# Patient Record
Sex: MALE | Race: WHITE | NOT HISPANIC OR LATINO | Employment: UNEMPLOYED | URBAN - METROPOLITAN AREA
[De-identification: names, ages, dates, MRNs, and addresses within clinical notes are randomized per-mention and may not be internally consistent; named-entity substitution may affect disease eponyms.]

---

## 2017-05-16 ENCOUNTER — TELEPHONE (OUTPATIENT)
Dept: FAMILY MEDICINE | Facility: CLINIC | Age: 57
End: 2017-05-16

## 2017-05-16 NOTE — TELEPHONE ENCOUNTER
Panel Management Review          Composite cancer screening  Chart review shows that this patient is due/due soon for the following Colonoscopy  Summary:    Patient is due/failing the following:   COLONOSCOPY    Action needed:   Patient needs referral/order: colonoscopy    Type of outreach:    Sent letter.    Questions for provider review:    None                                                                                                                                    Heavenly Arora CMA

## 2017-05-16 NOTE — LETTER
[facility]  [address] [address]  Phone: [phone]    May 16, 2017    Guillaume Borrerostrom  [address] [place] [address]              Dear Mr. Garcia,    At this time you are due for a Colon Cancer Screening. Here is some information regarding this testing.     Recommended every 5-10 years, depending on your history, in order to prevent and detect colon cancer at its earliest stages.  Colon cancer is now the second leading cause of death in the [place] for both men and women and there are over 130,000 new cases and 50,000 deaths per year from colon cancer.  Colonoscopies can prevent 90-95% of these deaths.  Problem lesions can be removed before they ever become cancer. This test is not only looking for cancer, but also getting rid of precancerious lesions. You are usually given some sedation which makes the test very comfortable for most people.      If you do not wish to do a colonoscopy or cannot afford to do one, at this time, there is another option. It is called a FIT test or Fecal Immunochemical Occult Blood Test (take home stool sample kit).  It does not replace the colonoscopy for colorectal cancer screening, but it can detect hidden bleeding in the lower colon.  It does need to be repeated every year and if a positive result is obtained, you would be referred for a colonoscopy. The FIT test is really easy to do and does not require any  diet or medication restrictions and involves only one collection sample.      If you have completed either one of these tests or had a flexible sigmoidoscopy in the past five years at another facility, please have the records sent to our clinic so that we can best coordinate your care and update your chart.  Please call us if you have questions or would like arrange either to do a colonoscopy or obtain the necessary test kit for the Fecal Occult Test.    Sincerely,      Bobby Hanks MD / dipti

## 2018-08-15 ENCOUNTER — NURSE TRIAGE (OUTPATIENT)
Dept: NURSING | Facility: CLINIC | Age: 58
End: 2018-08-15

## 2018-08-15 NOTE — TELEPHONE ENCOUNTER
Connected to schedulers.  Mckenzie Wray RN  [facility]      Reason for Disposition    Shoulder pain is a chronic symptom (recurrent or ongoing AND present > 4 weeks)    Additional Information    Negative: Difficulty breathing or unusual sweating (e.g., sweating without exertion)    Negative: [1] Pain lasting > 5 minutes AND [2] pain also present in chest (Exception: pain is clearly made worse by movement)    Negative: [1] Age > 40 AND [2] no obvious cause AND [3] pain even when not moving the arm    (Exception: pain is clearly made worse by moving arm or bending neck)    Negative: [1] SEVERE pain AND [2] not improved 2 hours after pain medicine    Negative: [1] Red area or streak AND [2] fever    Negative: [1] Swollen joint AND [2] fever    Negative: Patient sounds very sick or weak to the triager    Protocols used: SHOULDER PAIN-ADULT-

## 2019-03-06 ENCOUNTER — ANCILLARY PROCEDURE (OUTPATIENT)
Dept: GENERAL RADIOLOGY | Facility: CLINIC | Age: 59
End: 2019-03-06
Attending: PEDIATRICS
Payer: MEDICARE

## 2019-03-06 ENCOUNTER — OFFICE VISIT (OUTPATIENT)
Dept: ORTHOPEDICS | Facility: CLINIC | Age: 59
End: 2019-03-06
Payer: MEDICARE

## 2019-03-06 VITALS
BODY MASS INDEX: 22.68 KG/M2 | WEIGHT: 162 LBS | HEIGHT: 71 IN | DIASTOLIC BLOOD PRESSURE: 72 MMHG | SYSTOLIC BLOOD PRESSURE: 114 MMHG

## 2019-03-06 DIAGNOSIS — M19.011 ARTHRITIS OF RIGHT SHOULDER REGION: ICD-10-CM

## 2019-03-06 DIAGNOSIS — M19.012 ARTHRITIS OF BOTH ACROMIOCLAVICULAR JOINTS: ICD-10-CM

## 2019-03-06 DIAGNOSIS — M19.011 ARTHRITIS OF BOTH ACROMIOCLAVICULAR JOINTS: ICD-10-CM

## 2019-03-06 DIAGNOSIS — M25.511 RIGHT SHOULDER PAIN: ICD-10-CM

## 2019-03-06 DIAGNOSIS — M25.511 CHRONIC RIGHT SHOULDER PAIN: Primary | ICD-10-CM

## 2019-03-06 DIAGNOSIS — G89.29 CHRONIC RIGHT SHOULDER PAIN: Primary | ICD-10-CM

## 2019-03-06 DIAGNOSIS — S43.102S SEPARATION OF LEFT ACROMIOCLAVICULAR JOINT, SEQUELA: ICD-10-CM

## 2019-03-06 PROCEDURE — 99204 OFFICE O/P NEW MOD 45 MIN: CPT | Performed by: PEDIATRICS

## 2019-03-06 PROCEDURE — 73030 X-RAY EXAM OF SHOULDER: CPT | Mod: RT

## 2019-03-06 ASSESSMENT — MIFFLIN-ST. JEOR: SCORE: 1576.96

## 2019-03-06 NOTE — LETTER
3/6/2019         RE: Guillaume Garcia  [address] [place] [id number]        Dear Colleague,    Thank you for referring your patient, Guillaume Garcia, to the [facility]. Please see a copy of my visit note below.    Sports Medicine Clinic Visit    PCP: No Ref-Primary, Physician    Guillaume Garcia is a 58 year old male who is seen  as a self referral presenting with bilateral shoulder pain, would like to focus on right shoulder.    Injury: He reports 2 years of chronic shoulder pain. He reports he slipped on ice landing on right shoulder 2 years ago and again 3 weeks ago. He reports pain with shoulder flexion and abduction. He has popping and clicking in in his shoulder. He has difficulty with overhead motions. He is right hand dominate.  - Left shoulder injury years ago, possible dislocation a she describes someone trying to relocate.    Location of Pain: right posterior shoulder  Duration of Pain: 2 year(s)  Rating of Pain at worst: 8/10  Rating of Pain Currently: 7/10  Symptoms are better with: rest  Symptoms are worse with: flexion and overhead motions  Additional Features:   Positive: popping, grinding, instability and weakness   Negative: swelling, bruising, catching, locking, paresthesias and numbness  Other evaluation and/or treatments so far consists of: Nothing  Prior History of related problems: fell on ice twice over the last 2 years    Social History: Disability    Review of Systems  Skin: no bruising, no swelling  Musculoskeletal: as above  Neurologic: no numbness, paresthesias  Remainder of review of systems is negative including constitutional, CV, pulmonary, GI, except as noted in HPI or medical history.    Patient's current problem list, past medical and surgical history, and family history were reviewed.    There is no problem list on file for this patient.    No past medical history on file.  No past surgical history on file.  Family History   Problem  "Relation Age of Onset     Unknown/Adopted Mother      Unknown/Adopted Father      Unknown/Adopted Brother      Unknown/Adopted Sister      Unknown/Adopted Daughter      Unknown/Adopted Brother          Objective  /72 (BP Location: Left arm, Patient Position: Chair, Cuff Size: Adult Regular)   Ht 1.803 m (5' 11\")   Wt 73.5 kg (162 lb)   BMI 22.59 kg/m       GENERAL APPEARANCE: healthy, alert and no distress   GAIT: NORMAL  SKIN: no suspicious lesions or rashes  HEENT: Sclera clear, anicteric  CV: good peripheral pulses  RESP: Breathing not labored  NEURO: Normal strength and tone, mentation intact and speech normal  PSYCH:  mentation appears normal and affect normal/bright    Bilateral Shoulder exam  Inspection and Posture:       Bilateral AC joint deformities, right > left    Skin:        no visible deformities    Tender:        acromioclavicular joint bilateral (mild)       subacromial space bilateral (mild)    Non Tender:       remainder of shoulder bilateral    ROM:        forward flexion 140  bilateral       abduction 140 bilateral       internal rotation lumbar region bilateral       external rotation 45 bilateral    Painful motions:       end range flexion and elevation bilateral    Strength:        abduction 4/5 bilateral       flexion 4/5 bilateral       internal rotation 5/5 bilateral       external rotation 5/5 bilateral    Impingement testing:       positive (+) Neer bilateral       positive (+) Saunders bilateral    Sensation:        normal sensation over shoulder and upper extremity     Radiology  I ordered, visualized and reviewed these images with the patient  3 XR views of right shoulder reviewed: glenohumeral and ac joint arthritis with high riding humeral head  - will follow official read    I visualized and reviewed these images with the patient  LEFT SHOULDER TWO VIEWS  12/6/2016 10:50 AM  HISTORY: Acquired deformity.  COMPARISON: None.                                                      "      IMPRESSION: No fracture or osseous lesion is seen. The glenohumeral  joint is well preserved. There is widening of the acromioclavicular  joint measuring up to 1.2 cm. There is also calcification or  ossification adjacent to the joint space. This may be due to an old  injury or degenerative change. No other abnormality is seen. No  definite acute pathology is noted.      Assessment:  1. Chronic right shoulder pain    2. Arthritis of right shoulder region    3. Arthritis of both acromioclavicular joints    4. Separation of left acromioclavicular joint, sequela      Chronic right shoulder pain with likely chronic rotator cuff tear given arthritis.  We discussed the following treatment options: symptom treatment, activity modification/rest, imaging, rehab and referral. Following discussion, plan:will obtain MRI to evaluate rotator cuff.  - Left shoulder likely AC arthritis with prior AC sprain.  Discussed options for treatment including PT and , patient would like to focus on right shoulder first.    Plan:  - Today's Plan of Care:  MRI of the right shoulder. Call [phone] to schedule MRI    -We also discussed other future treatment options:  Referral to orthopedic surgeon, Rehab: Physical Therapy or Steroid injection of shoulder    Follow Up: In clinic with Dr. Blanco after MRI (wait at least 1-2 days)    Concerning signs and symptoms were reviewed.  The patient expressed understanding of this management plan and all questions were answered at this time.    Yennifer Blanco MD CAQ  Primary Care Sports Medicine  [facility]    Again, thank you for allowing me to participate in the care of your patient.        Sincerely,        Yennifer Blanco MD

## 2019-03-06 NOTE — PROGRESS NOTES
Sports Medicine Clinic Visit    PCP: No Ref-Primary, Physician    Guillaume Garcia is a 58 year old male who is seen  as a self referral presenting with bilateral shoulder pain, would like to focus on right shoulder.    Injury: He reports 2 years of chronic shoulder pain. He reports he slipped on ice landing on right shoulder 2 years ago and again 3 weeks ago. He reports pain with shoulder flexion and abduction. He has popping and clicking in in his shoulder. He has difficulty with overhead motions. He is right hand dominate.  - Left shoulder injury years ago, possible dislocation a she describes someone trying to relocate.    Location of Pain: right posterior shoulder  Duration of Pain: 2 year(s)  Rating of Pain at worst: 8/10  Rating of Pain Currently: 7/10  Symptoms are better with: rest  Symptoms are worse with: flexion and overhead motions  Additional Features:   Positive: popping, grinding, instability and weakness   Negative: swelling, bruising, catching, locking, paresthesias and numbness  Other evaluation and/or treatments so far consists of: Nothing  Prior History of related problems: fell on ice twice over the last 2 years    Social History: Disability    Review of Systems  Skin: no bruising, no swelling  Musculoskeletal: as above  Neurologic: no numbness, paresthesias  Remainder of review of systems is negative including constitutional, CV, pulmonary, GI, except as noted in HPI or medical history.    Patient's current problem list, past medical and surgical history, and family history were reviewed.    There is no problem list on file for this patient.    No past medical history on file.  No past surgical history on file.  Family History   Problem Relation Age of Onset     Unknown/Adopted Mother      Unknown/Adopted Father      Unknown/Adopted Brother      Unknown/Adopted Sister      Unknown/Adopted Daughter      Unknown/Adopted Brother          Objective  /72 (BP Location: Left arm, Patient  "Position: Chair, Cuff Size: Adult Regular)   Ht 1.803 m (5' 11\")   Wt 73.5 kg (162 lb)   BMI 22.59 kg/m      GENERAL APPEARANCE: healthy, alert and no distress   GAIT: NORMAL  SKIN: no suspicious lesions or rashes  HEENT: Sclera clear, anicteric  CV: good peripheral pulses  RESP: Breathing not labored  NEURO: Normal strength and tone, mentation intact and speech normal  PSYCH:  mentation appears normal and affect normal/bright    Bilateral Shoulder exam  Inspection and Posture:       Bilateral AC joint deformities, right > left    Skin:        no visible deformities    Tender:        acromioclavicular joint bilateral (mild)       subacromial space bilateral (mild)    Non Tender:       remainder of shoulder bilateral    ROM:        forward flexion 140  bilateral       abduction 140 bilateral       internal rotation lumbar region bilateral       external rotation 45 bilateral    Painful motions:       end range flexion and elevation bilateral    Strength:        abduction 4/5 bilateral       flexion 4/5 bilateral       internal rotation 5/5 bilateral       external rotation 5/5 bilateral    Impingement testing:       positive (+) Neer bilateral       positive (+) Saunders bilateral    Sensation:        normal sensation over shoulder and upper extremity     Radiology  I ordered, visualized and reviewed these images with the patient  3 XR views of right shoulder reviewed: glenohumeral and ac joint arthritis with high riding humeral head  - will follow official read    I visualized and reviewed these images with the patient  LEFT SHOULDER TWO VIEWS  12/6/2016 10:50 AM  HISTORY: Acquired deformity.  COMPARISON: None.                                                           IMPRESSION: No fracture or osseous lesion is seen. The glenohumeral  joint is well preserved. There is widening of the acromioclavicular  joint measuring up to 1.2 cm. There is also calcification or  ossification adjacent to the joint space. This may " be due to an old  injury or degenerative change. No other abnormality is seen. No  definite acute pathology is noted.      Assessment:  1. Chronic right shoulder pain    2. Arthritis of right shoulder region    3. Arthritis of both acromioclavicular joints    4. Separation of left acromioclavicular joint, sequela      Chronic right shoulder pain with likely chronic rotator cuff tear given arthritis.  We discussed the following treatment options: symptom treatment, activity modification/rest, imaging, rehab and referral. Following discussion, plan:will obtain MRI to evaluate rotator cuff.  - Left shoulder likely AC arthritis with prior AC sprain.  Discussed options for treatment including PT and , patient would like to focus on right shoulder first.    Plan:  - Today's Plan of Care:  MRI of the right shoulder. Call [phone] to schedule MRI    -We also discussed other future treatment options:  Referral to orthopedic surgeon, Rehab: Physical Therapy or Steroid injection of shoulder    Follow Up: In clinic with Dr. Blanco after MRI (wait at least 1-2 days)    Concerning signs and symptoms were reviewed.  The patient expressed understanding of this management plan and all questions were answered at this time.    Yennifer Blanco MD CA  Primary Care Sports Medicine  [facility]

## 2019-03-06 NOTE — PATIENT INSTRUCTIONS
Plan:  - Today's Plan of Care:  MRI of the right shoulder. Call [phone] to schedule MRI    -We also discussed other future treatment options:  Referral to orthopedic surgeon, Rehab: Physical Therapy or Steroid injection of shoulder    Follow Up: In clinic with Dr. Blanco after MRI (wait at least 1-2 days)    If you have any further questions for your physician or physician s care team you can call [phone] and use option 3 to leave a voice message. Calls received during business hours will be returned same day.

## 2019-03-13 ENCOUNTER — HOSPITAL ENCOUNTER (OUTPATIENT)
Dept: MRI IMAGING | Facility: CLINIC | Age: 59
Discharge: HOME | End: 2019-03-13
Attending: PEDIATRICS | Admitting: PEDIATRICS
Payer: MEDICARE

## 2019-03-13 DIAGNOSIS — M25.511 CHRONIC RIGHT SHOULDER PAIN: ICD-10-CM

## 2019-03-13 DIAGNOSIS — M19.011 ARTHRITIS OF BOTH ACROMIOCLAVICULAR JOINTS: ICD-10-CM

## 2019-03-13 DIAGNOSIS — M19.011 ARTHRITIS OF RIGHT SHOULDER REGION: ICD-10-CM

## 2019-03-13 DIAGNOSIS — G89.29 CHRONIC RIGHT SHOULDER PAIN: ICD-10-CM

## 2019-03-13 DIAGNOSIS — M19.012 ARTHRITIS OF BOTH ACROMIOCLAVICULAR JOINTS: ICD-10-CM

## 2019-03-13 PROCEDURE — 73221 MRI JOINT UPR EXTREM W/O DYE: CPT | Mod: RT

## 2019-03-27 ENCOUNTER — TELEPHONE (OUTPATIENT)
Dept: ORTHOPEDICS | Facility: CLINIC | Age: 59
End: 2019-03-27

## 2019-03-27 ENCOUNTER — OFFICE VISIT (OUTPATIENT)
Dept: ORTHOPEDICS | Facility: CLINIC | Age: 59
End: 2019-03-27
Payer: MEDICARE

## 2019-03-27 VITALS
SYSTOLIC BLOOD PRESSURE: 118 MMHG | BODY MASS INDEX: 22.82 KG/M2 | HEIGHT: 71 IN | WEIGHT: 163 LBS | DIASTOLIC BLOOD PRESSURE: 62 MMHG

## 2019-03-27 DIAGNOSIS — M19.011 ARTHRITIS OF BOTH ACROMIOCLAVICULAR JOINTS: ICD-10-CM

## 2019-03-27 DIAGNOSIS — M25.511 CHRONIC RIGHT SHOULDER PAIN: Primary | ICD-10-CM

## 2019-03-27 DIAGNOSIS — G89.29 CHRONIC RIGHT SHOULDER PAIN: Primary | ICD-10-CM

## 2019-03-27 DIAGNOSIS — M12.811 ROTATOR CUFF ARTHROPATHY OF RIGHT SHOULDER: ICD-10-CM

## 2019-03-27 DIAGNOSIS — S43.102S SEPARATION OF LEFT ACROMIOCLAVICULAR JOINT, SEQUELA: ICD-10-CM

## 2019-03-27 DIAGNOSIS — M19.012 ARTHRITIS OF BOTH ACROMIOCLAVICULAR JOINTS: ICD-10-CM

## 2019-03-27 DIAGNOSIS — M19.011 ARTHRITIS OF RIGHT SHOULDER REGION: ICD-10-CM

## 2019-03-27 PROCEDURE — 99213 OFFICE O/P EST LOW 20 MIN: CPT | Performed by: PEDIATRICS

## 2019-03-27 ASSESSMENT — MIFFLIN-ST. JEOR: SCORE: 1581.49

## 2019-03-27 NOTE — PROGRESS NOTES
Sports Medicine Clinic Visit - Interim History March 27, 2019    PCP: No Ref-Primary, Physician    Guillaume Garcia is a 58 year old male who is seen in f/u up for    Chronic right shoulder pain  Arthritis of right shoulder region  Arthritis of both acromioclavicular joints  Separation of left acromioclavicular joint, sequela  Rotator cuff arthropathy of right shoulder.  Since last visit on 3/6/19 patient has completed an MRI of his right shoulder. He reports an improvement in his ROM especially with over head directions. He reports the pain has not improved. He still reports popping and instability in his shoulder.    Initial Injury History 3/6/2019: He reports 2 years of chronic shoulder pain. He reports he slipped on ice landing on right shoulder 2 years ago and again 3 weeks ago. He reports pain with shoulder flexion and abduction. He has popping and clicking in in his shoulder. He has difficulty with overhead motions. He is right hand dominate.  - Left shoulder injury years ago, possible dislocation a she describes someone trying to relocate.    Symptoms are better with: Rest  Symptoms are worse with: flexion and overhead motions  Additional Features:   Positive: popping, grinding, instability and weakness   Negative: swelling, bruising, catching, locking, paresthesias and numbness    Social History: Disability    Review of Systems  Skin: no bruising, no swelling  Musculoskeletal: as above  Neurologic: no numbness, paresthesias  Remainder of review of systems is negative including constitutional, CV, pulmonary, GI, except as noted in HPI or medical history.    Patient's current problem list, past medical and surgical history, and family history were reviewed.    There is no problem list on file for this patient.    No past medical history on file.  No past surgical history on file.  Family History   Problem Relation Age of Onset     Unknown/Adopted Mother      Unknown/Adopted Father      Unknown/Adopted Brother  "     Unknown/Adopted Sister      Unknown/Adopted Daughter      Unknown/Adopted Brother        Objective  /62 (BP Location: Left arm, Patient Position: Chair, Cuff Size: Adult Regular)   Ht 1.803 m (5' 11\")   Wt 73.9 kg (163 lb)   BMI 22.73 kg/m      GENERAL APPEARANCE: healthy, alert and no distress   GAIT: NORMAL  SKIN: no suspicious lesions or rashes  HEENT: Sclera clear, anicteric  CV: good peripheral pulses  RESP: Breathing not labored  NEURO: Normal strength and tone, mentation intact and speech normal  PSYCH:  mentation appears normal and affect normal/bright     Bilateral Shoulder exam  Inspection and Posture:       Bilateral AC joint deformities, right > left     Skin:        no visible deformities     Tender:        acromioclavicular joint bilateral (mild)       subacromial space bilateral (mild)     Non Tender:       remainder of shoulder bilateral     ROM:        forward flexion 140  bilateral       abduction 140 bilateral       internal rotation lumbar region bilateral       external rotation 45 bilateral     Painful motions:       end range flexion and elevation bilateral     Strength:        abduction 4/5 bilateral       flexion 4/5 bilateral       internal rotation 5/5 bilateral       external rotation 5/5 bilateral     Impingement testing:       positive (+) Neer bilateral       positive (+) Saunders bilateral     Sensation:        normal sensation over shoulder and upper extremity     Radiology  I ordered, visualized and reviewed these images with the patient  MR SHOULDER RIGHT WITHOUT CONTRAST March 13, 2019 9:45 AM     HISTORY: Chronic right shoulder pain and acromioclavicular joint  arthritis. History of injury. Evaluate for rotator cuff tear.     TECHNIQUE: Oblique coronal T1, T2 and T2 fat suppressed images,  oblique sagittal T2 and axial proton density and T2 weighted images  were obtained.      COMPARISON: None.     FINDINGS:  Osseous acromial outlet: Marked hypertrophic degenerative " change at  the acromioclavicular joint. Distal acromial morphology is type II  with neutral slope on oblique sagittal images and mild lateral  downsloping on oblique coronal images. There is a small amount of  abnormal fluid in the subacromial/subdeltoid bursa.     Rotator cuff: There is a full-thickness tear of the supraspinatus  tendon measuring up to 4.2 cm mediolateral dimension and 3.9 cm  anteroposterior dimension. The torn tendon margin is retracted  medially to the level of the bony glenoid margin (image 10 of series  7). Early fatty atrophy of the supraspinatus muscle. The infraspinatus  tendon shows some mild thickening and signal heterogeneity consistent  with degenerative tendinopathy but there is no evidence for  infraspinatus tear. The infraspinatus and teres minor muscles are  normal. The subscapularis tendon is markedly thinned consistent with  advanced degenerative tendinopathy and/or partial thickness tearing  (image 15 of series 5). Moderate to marked fatty atrophy involving the  superior aspect of the subscapularis muscle.     Labrum: The superior glenoid labrum is small and shows ill-defined  linear intrasubstance increased signal which may be degeneration alone  but a SLAP tear is not excluded and could be better evaluated with MR  arthrogram of clinically indicated. The anterior and posterior labrum  are not present consistent with diffuse chronic degeneration and  maceration.     Biceps tendon: There is mild enlargement and intrasubstance increased  signal of the intra-articular biceps tendon consistent with mucoid  degeneration. The tendon appears normal more distally in the bicipital  groove. There is a small biceps tendon sheath effusion.       Osseous structures and cartilaginous surfaces: No acute appearing bony  abnormality. Diffuse grade 2 chondromalacia throughout the  glenohumeral joint. Mild marginal bony spurring of the humeral head.     Additional findings: Small joint space  effusion with evidence for  synovitis.                                                                      IMPRESSION:   1. Large full-thickness tear of the supraspinatus tendon with very  early fatty atrophy of the supraspinatus muscle.  2. Marked degenerative thinning and/or partial thickness tearing of  the subscapularis tendon with fatty atrophy of the superior aspect of  the subscapularis muscle.  3. Diffuse degeneration/maceration of the anterior and posterior  glenoid labrum with probable degeneration of the superior glenoid  labrum. A superior labral tear is not excluded.  4. Mucoid degeneration proximal biceps tendon.  5. Early osteoarthritis glenohumeral joint and marked hypertrophic  degenerative change at the acromioclavicular joint.  6. Small joint space effusion with evidence for synovitis.       I visualized and reviewed these images with the patient  SHOULDER RIGHT THREE OR MORE VIEWS   3/6/2019 10:38 AM   HISTORY: Right shoulder pain.  COMPARISON: None.                                                       IMPRESSION: Moderate to advanced acromioclavicular degenerative  changes. Mild to moderate glenohumeral degenerative changes. No acute  fracture or subluxation.    Assessment:  1. Chronic right shoulder pain    2. Arthritis of right shoulder region    3. Arthritis of both acromioclavicular joints    4. Separation of left acromioclavicular joint, sequela    5. Rotator cuff arthropathy of right shoulder      Rotator cuff arthropathy on the right, improving.  Reviewed MRI with likely chronic rotator cuff tear and arthritis.  We discussed the following treatment options: symptom treatment, activity modification/rest, imaging, rehab and referral. Following discussion, plan: patient will continue to monitor symptoms given improvement, will start PT and consider injection.    Plan:  - Today's Plan of Care:  Rehab: [facility] [phone]    -We also discussed other future  treatment options:  Referral to orthopedic surgeon or Steroid injection of shoulder    Follow Up: as needed    Concerning signs and symptoms were reviewed.  The patient expressed understanding of this management plan and all questions were answered at this time.    Yennifer Blanco MD CA  Primary Care Sports Medicine  [facility]

## 2019-03-27 NOTE — TELEPHONE ENCOUNTER
Reason for Call:  Other medication    Detailed comments: Pt is asking if he can get an RX for pain meds, his shoulder is hurting, please call him     Phone Number Patient can be reached at: Home number on file [phone] (home)    Best Time: today    Can we leave a detailed message on this number? YES    Call taken on 3/27/2019 at 2:32 PM by Lurdes Goldman

## 2019-03-27 NOTE — LETTER
3/27/2019         RE: Guillaume Garcia  [address] [facility] [address]        Dear Colleague,    Thank you for referring your patient, Guillaume Garcia, to the [facility]. Please see a copy of my visit note below.    Sports Medicine Clinic Visit - Interim History March 27, 2019    PCP: No Ref-Primary, Physician    Guillaume Garcia is a 58 year old male who is seen in f/u up for    Chronic right shoulder pain  Arthritis of right shoulder region  Arthritis of both acromioclavicular joints  Separation of left acromioclavicular joint, sequela  Rotator cuff arthropathy of right shoulder.  Since last visit on 3/6/19 patient has completed an MRI of his right shoulder. He reports an improvement in his ROM especially with over head directions. He reports the pain has not improved. He still reports popping and instability in his shoulder.    Initial Injury History 3/6/2019: He reports 2 years of chronic shoulder pain. He reports he slipped on ice landing on right shoulder 2 years ago and again 3 weeks ago. He reports pain with shoulder flexion and abduction. He has popping and clicking in in his shoulder. He has difficulty with overhead motions. He is right hand dominate.  - Left shoulder injury years ago, possible dislocation a she describes someone trying to relocate.    Symptoms are better with: Rest  Symptoms are worse with: flexion and overhead motions  Additional Features:   Positive: popping, grinding, instability and weakness   Negative: swelling, bruising, catching, locking, paresthesias and numbness    Social History: Disability    Review of Systems  Skin: no bruising, no swelling  Musculoskeletal: as above  Neurologic: no numbness, paresthesias  Remainder of review of systems is negative including constitutional, CV, pulmonary, GI, except as noted in HPI or medical history.    Patient's current problem list, past medical and surgical history, and family history were  "reviewed.    There is no problem list on file for this patient.    No past medical history on file.  No past surgical history on file.  Family History   Problem Relation Age of Onset     Unknown/Adopted Mother      Unknown/Adopted Father      Unknown/Adopted Brother      Unknown/Adopted Sister      Unknown/Adopted Daughter      Unknown/Adopted Brother        Objective  /62 (BP Location: Left arm, Patient Position: Chair, Cuff Size: Adult Regular)   Ht 1.803 m (5' 11\")   Wt 73.9 kg (163 lb)   BMI 22.73 kg/m       GENERAL APPEARANCE: healthy, alert and no distress   GAIT: NORMAL  SKIN: no suspicious lesions or rashes  HEENT: Sclera clear, anicteric  CV: good peripheral pulses  RESP: Breathing not labored  NEURO: Normal strength and tone, mentation intact and speech normal  PSYCH:  mentation appears normal and affect normal/bright     Bilateral Shoulder exam  Inspection and Posture:       Bilateral AC joint deformities, right > left     Skin:        no visible deformities     Tender:        acromioclavicular joint bilateral (mild)       subacromial space bilateral (mild)     Non Tender:       remainder of shoulder bilateral     ROM:        forward flexion 140  bilateral       abduction 140 bilateral       internal rotation lumbar region bilateral       external rotation 45 bilateral     Painful motions:       end range flexion and elevation bilateral     Strength:        abduction 4/5 bilateral       flexion 4/5 bilateral       internal rotation 5/5 bilateral       external rotation 5/5 bilateral     Impingement testing:       positive (+) Neer bilateral       positive (+) Saunders bilateral     Sensation:        normal sensation over shoulder and upper extremity     Radiology  I ordered, visualized and reviewed these images with the patient  MR SHOULDER RIGHT WITHOUT CONTRAST March 13, 2019 9:45 AM     HISTORY: Chronic right shoulder pain and acromioclavicular joint  arthritis. History of injury. Evaluate for " rotator cuff tear.     TECHNIQUE: Oblique coronal T1, T2 and T2 fat suppressed images,  oblique sagittal T2 and axial proton density and T2 weighted images  were obtained.      COMPARISON: None.     FINDINGS:  Osseous acromial outlet: Marked hypertrophic degenerative change at  the acromioclavicular joint. Distal acromial morphology is type II  with neutral slope on oblique sagittal images and mild lateral  downsloping on oblique coronal images. There is a small amount of  abnormal fluid in the subacromial/subdeltoid bursa.     Rotator cuff: There is a full-thickness tear of the supraspinatus  tendon measuring up to 4.2 cm mediolateral dimension and 3.9 cm  anteroposterior dimension. The torn tendon margin is retracted  medially to the level of the bony glenoid margin (image 10 of series  7). Early fatty atrophy of the supraspinatus muscle. The infraspinatus  tendon shows some mild thickening and signal heterogeneity consistent  with degenerative tendinopathy but there is no evidence for  infraspinatus tear. The infraspinatus and teres minor muscles are  normal. The subscapularis tendon is markedly thinned consistent with  advanced degenerative tendinopathy and/or partial thickness tearing  (image 15 of series 5). Moderate to marked fatty atrophy involving the  superior aspect of the subscapularis muscle.     Labrum: The superior glenoid labrum is small and shows ill-defined  linear intrasubstance increased signal which may be degeneration alone  but a SLAP tear is not excluded and could be better evaluated with MR  arthrogram of clinically indicated. The anterior and posterior labrum  are not present consistent with diffuse chronic degeneration and  maceration.     Biceps tendon: There is mild enlargement and intrasubstance increased  signal of the intra-articular biceps tendon consistent with mucoid  degeneration. The tendon appears normal more distally in the bicipital  groove. There is a small biceps tendon  sheath effusion.       Osseous structures and cartilaginous surfaces: No acute appearing bony  abnormality. Diffuse grade 2 chondromalacia throughout the  glenohumeral joint. Mild marginal bony spurring of the humeral head.     Additional findings: Small joint space effusion with evidence for  synovitis.                                                                      IMPRESSION:   1. Large full-thickness tear of the supraspinatus tendon with very  early fatty atrophy of the supraspinatus muscle.  2. Marked degenerative thinning and/or partial thickness tearing of  the subscapularis tendon with fatty atrophy of the superior aspect of  the subscapularis muscle.  3. Diffuse degeneration/maceration of the anterior and posterior  glenoid labrum with probable degeneration of the superior glenoid  labrum. A superior labral tear is not excluded.  4. Mucoid degeneration proximal biceps tendon.  5. Early osteoarthritis glenohumeral joint and marked hypertrophic  degenerative change at the acromioclavicular joint.  6. Small joint space effusion with evidence for synovitis.       I visualized and reviewed these images with the patient  SHOULDER RIGHT THREE OR MORE VIEWS   3/6/2019 10:38 AM   HISTORY: Right shoulder pain.  COMPARISON: None.                                                       IMPRESSION: Moderate to advanced acromioclavicular degenerative  changes. Mild to moderate glenohumeral degenerative changes. No acute  fracture or subluxation.    Assessment:  1. Chronic right shoulder pain    2. Arthritis of right shoulder region    3. Arthritis of both acromioclavicular joints    4. Separation of left acromioclavicular joint, sequela    5. Rotator cuff arthropathy of right shoulder      Rotator cuff arthropathy on the right, improving.  Reviewed MRI with likely chronic rotator cuff tear and arthritis.  We discussed the following treatment options: symptom treatment, activity modification/rest, imaging, rehab and  referral. Following discussion, plan: patient will continue to monitor symptoms given improvement, will start PT and consider injection.    Plan:  - Today's Plan of Care:  Rehab: Physical Therapy: [facility] - [phone]    -We also discussed other future treatment options:  Referral to orthopedic surgeon or Steroid injection of shoulder    Follow Up: as needed    Concerning signs and symptoms were reviewed.  The patient expressed understanding of this management plan and all questions were answered at this time.    Yennifer Blanco MD CAQ  Primary Care Sports Medicine  [facility]    Again, thank you for allowing me to participate in the care of your patient.        Sincerely,        Yennifer Blanco MD

## 2019-03-27 NOTE — TELEPHONE ENCOUNTER
Ok to call patient to discuss.  We did not discuss any pain medications.  Would advise OTC medications if patient is able to take these.  Could follow up for possible steroid injection as discussed.    Yennifer Blanco MD

## 2019-03-27 NOTE — PATIENT INSTRUCTIONS
Plan:  - Today's Plan of Care:  Rehab: Physical Therapy: [facility] - [phone]    -We also discussed other future treatment options:  Referral to orthopedic surgeon or Steroid injection of shoulder    Follow Up: as needed    If you have any further questions for your physician or physician s care team you can call [phone] and use option 3 to leave a voice message. Calls received during business hours will be returned same day.

## 2019-08-23 ENCOUNTER — HOSPITAL ENCOUNTER (EMERGENCY)
Facility: CLINIC | Age: 59
Discharge: HOME | End: 2019-08-23
Attending: EMERGENCY MEDICINE | Admitting: EMERGENCY MEDICINE
Payer: MEDICARE

## 2019-08-23 VITALS
DIASTOLIC BLOOD PRESSURE: 100 MMHG | OXYGEN SATURATION: 99 % | SYSTOLIC BLOOD PRESSURE: 145 MMHG | RESPIRATION RATE: 18 BRPM | TEMPERATURE: 99.6 F | HEART RATE: 67 BPM

## 2019-08-23 DIAGNOSIS — R63.4 WEIGHT LOSS: ICD-10-CM

## 2019-08-23 DIAGNOSIS — R10.9 ABDOMINAL DISCOMFORT: ICD-10-CM

## 2019-08-23 DIAGNOSIS — K40.90 LEFT INGUINAL HERNIA: ICD-10-CM

## 2019-08-23 LAB
ALBUMIN SERPL-MCNC: 4.6 G/DL (ref 3.4–5)
ALBUMIN UR-MCNC: NEGATIVE MG/DL
ALP SERPL-CCNC: 82 U/L (ref 40–150)
ALT SERPL W P-5'-P-CCNC: 26 U/L (ref 0–70)
ANION GAP SERPL CALCULATED.3IONS-SCNC: 7 MMOL/L (ref 3–14)
APPEARANCE UR: CLEAR
AST SERPL W P-5'-P-CCNC: 20 U/L (ref 0–45)
BASOPHILS # BLD AUTO: 0.1 10E9/L (ref 0–0.2)
BASOPHILS NFR BLD AUTO: 0.8 %
BILIRUB SERPL-MCNC: 0.7 MG/DL (ref 0.2–1.3)
BILIRUB UR QL STRIP: NEGATIVE
BUN SERPL-MCNC: 11 MG/DL (ref 7–30)
CALCIUM SERPL-MCNC: 9.3 MG/DL (ref 8.5–10.1)
CHLORIDE SERPL-SCNC: 105 MMOL/L (ref 94–109)
CO2 SERPL-SCNC: 27 MMOL/L (ref 20–32)
COLOR UR AUTO: YELLOW
CREAT SERPL-MCNC: 0.89 MG/DL (ref 0.66–1.25)
DIFFERENTIAL METHOD BLD: ABNORMAL
EOSINOPHIL # BLD AUTO: 0.2 10E9/L (ref 0–0.7)
EOSINOPHIL NFR BLD AUTO: 2.5 %
ERYTHROCYTE [DISTWIDTH] IN BLOOD BY AUTOMATED COUNT: 12.3 % (ref 10–15)
GFR SERPL CREATININE-BSD FRML MDRD: >90 ML/MIN/{1.73_M2}
GLUCOSE SERPL-MCNC: 103 MG/DL (ref 70–99)
GLUCOSE UR STRIP-MCNC: NEGATIVE MG/DL
HCT VFR BLD AUTO: 51.4 % (ref 40–53)
HGB BLD-MCNC: 17 G/DL (ref 13.3–17.7)
HGB UR QL STRIP: NEGATIVE
IMM GRANULOCYTES # BLD: 0 10E9/L (ref 0–0.4)
IMM GRANULOCYTES NFR BLD: 0.3 %
KETONES UR STRIP-MCNC: NEGATIVE MG/DL
LEUKOCYTE ESTERASE UR QL STRIP: NEGATIVE
LIPASE SERPL-CCNC: 341 U/L (ref 73–393)
LYMPHOCYTES # BLD AUTO: 1.9 10E9/L (ref 0.8–5.3)
LYMPHOCYTES NFR BLD AUTO: 20.2 %
MCH RBC QN AUTO: 28.3 PG (ref 26.5–33)
MCHC RBC AUTO-ENTMCNC: 33.1 G/DL (ref 31.5–36.5)
MCV RBC AUTO: 86 FL (ref 78–100)
MONOCYTES # BLD AUTO: 0.6 10E9/L (ref 0–1.3)
MONOCYTES NFR BLD AUTO: 6.6 %
NEUTROPHILS # BLD AUTO: 6.4 10E9/L (ref 1.6–8.3)
NEUTROPHILS NFR BLD AUTO: 69.6 %
NITRATE UR QL: NEGATIVE
NRBC # BLD AUTO: 0 10*3/UL
NRBC BLD AUTO-RTO: 0 /100
PH UR STRIP: 6 PH (ref 5–7)
PLATELET # BLD AUTO: 295 10E9/L (ref 150–450)
POTASSIUM SERPL-SCNC: 3.8 MMOL/L (ref 3.4–5.3)
PROT SERPL-MCNC: 8.3 G/DL (ref 6.8–8.8)
RBC # BLD AUTO: 6 10E12/L (ref 4.4–5.9)
SODIUM SERPL-SCNC: 139 MMOL/L (ref 133–144)
SOURCE: NORMAL
SP GR UR STRIP: 1.01 (ref 1–1.03)
UROBILINOGEN UR STRIP-MCNC: 0 MG/DL (ref 0–2)
WBC # BLD AUTO: 9.2 10E9/L (ref 4–11)

## 2019-08-23 PROCEDURE — 83690 ASSAY OF LIPASE: CPT | Performed by: EMERGENCY MEDICINE

## 2019-08-23 PROCEDURE — 80053 COMPREHEN METABOLIC PANEL: CPT | Performed by: EMERGENCY MEDICINE

## 2019-08-23 PROCEDURE — 81003 URINALYSIS AUTO W/O SCOPE: CPT | Performed by: EMERGENCY MEDICINE

## 2019-08-23 PROCEDURE — 99283 EMERGENCY DEPT VISIT LOW MDM: CPT | Performed by: EMERGENCY MEDICINE

## 2019-08-23 PROCEDURE — 36415 COLL VENOUS BLD VENIPUNCTURE: CPT | Performed by: EMERGENCY MEDICINE

## 2019-08-23 PROCEDURE — 99284 EMERGENCY DEPT VISIT MOD MDM: CPT | Mod: Z6 | Performed by: EMERGENCY MEDICINE

## 2019-08-23 PROCEDURE — 85025 COMPLETE CBC W/AUTO DIFF WBC: CPT | Performed by: EMERGENCY MEDICINE

## 2019-08-23 RX ORDER — HYDROXYZINE PAMOATE 50 MG/1
50 CAPSULE ORAL 3 TIMES DAILY PRN
Qty: 30 CAPSULE | Refills: 0 | Status: SHIPPED | OUTPATIENT
Start: 2019-08-23 | End: 2024-01-31

## 2019-08-23 RX ORDER — POLYETHYLENE GLYCOL 3350 17 G/17G
1 POWDER, FOR SOLUTION ORAL DAILY
Qty: 527 G | Refills: 0 | Status: SHIPPED | OUTPATIENT
Start: 2019-08-23 | End: 2019-09-22

## 2019-08-23 NOTE — ED AVS SNAPSHOT
[facility] Emergency Department  [address] [address]  Phone:  [phone]  Fax:  [phone]                                    Guillaume Garcia   MRN: [id number]    Department:  [facility] Emergency Department   Date of Visit:  8/23/2019           After Visit Summary Signature Page    I have received my discharge instructions, and my questions have been answered. I have discussed any challenges I see with this plan with the nurse or doctor.    ..........................................................................................................................................  Patient/Patient Representative Signature      ..........................................................................................................................................  Patient Representative Print Name and Relationship to Patient    ..................................................               ................................................  Date                                   Time    ..........................................................................................................................................  Reviewed by Signature/Title    ...................................................              ..............................................  Date                                               Time          22EPIC Rev 08/18

## 2019-08-23 NOTE — DISCHARGE INSTRUCTIONS
Try taking ranitidine 300 mg daily to see if this helps with your pain.  Try taking MiraLAX 1-2 capfuls in 6 to 8 ounces of fluid once or twice a day to help ensure one soft bowel movement daily.  You can increase the protein in your diet with protein shakes such as Ensure.  Follow-up in surgery clinic to discuss the hernia and possible colonoscopy.  Return to the emergency department for fevers, repeated vomiting, worsening pain, or other concerns.

## 2019-08-23 NOTE — ED PROVIDER NOTES
History     Chief Complaint   Patient presents with     Abdominal Pain     states he has a hernia     HPI  Guillaume Garcia is a 58 year old male who presents abdominal pain.  The patient says that he has been dealing with abdominal pain for the past 5 years.  Pain is fullness in the periumbilical area, rated as moderate to mild.  He reports waxing and waning pain over the past 5 years, worse when he does not eat, improves with eating.  He has not taking medications for this.  He says he has lost about 20 pounds over the past year.  He denies any nausea or vomiting.  He says he just does not feel hungry and needs to force himself to eat.  He also has been having trouble having bowel movements, states he has to strain hard to pass stool, has to strain hard and had noticed a lump in his rectum on and off that is not painful.  Also has noticed a lump in his inguinal area that comes and goes and is not currently painful.  Denies fever, chills, chest pain, dysuria, urinary frequency, or rash.  He does say he has quit drinking alcohol in the past 6 months and quit smoking marijuana in the past 2 days.    Allergies:  No Known Allergies    Problem List:    There are no active problems to display for this patient.       Past Medical History:    No past medical history on file.    Past Surgical History:    No past surgical history on file.    Family History:    Family History   Problem Relation Age of Onset     Unknown/Adopted Mother      Unknown/Adopted Father      Unknown/Adopted Brother      Unknown/Adopted Sister      Unknown/Adopted Daughter      Unknown/Adopted Brother        Social History:  Marital Status:  Single [1]  Social History     Tobacco Use     Smoking status: Former Smoker     Packs/day: 2.00     Types: Cigarettes     Last attempt to quit: 1988     Years since quittin.0     Smokeless tobacco: Never Used   Substance Use Topics     Alcohol use: Yes     Alcohol/week: 0.0 oz     Comment: sober for 1  1/2 months     Drug use: Yes     Comment: quit 2 months ago-smoked marijuana        Medications:      hydrOXYzine (VISTARIL) 50 MG capsule   polyethylene glycol (MIRALAX) powder   ranitidine (ZANTAC) 150 MG tablet         Review of Systems  Pertinent positives and negatives listed in the HPI, all other systems reviewed and are negative.    Physical Exam   BP: (!) 141/81  Pulse: 67  Heart Rate: 62  Temp: 99.6  F (37.6  C)  Resp: 20  SpO2: 99 %      Physical Exam   Constitutional: He is oriented to person, place, and time. He appears well-developed and well-nourished. He appears distressed.   HENT:   Head: Normocephalic and atraumatic.   Right Ear: External ear normal.   Left Ear: External ear normal.   Nose: Nose normal.   Eyes: Conjunctivae are normal. No scleral icterus.   Neck: Normal range of motion.   Cardiovascular: Normal rate and regular rhythm.   Pulmonary/Chest: Effort normal. No stridor. No respiratory distress.   Abdominal: Soft. He exhibits no distension. A hernia is present. Hernia confirmed positive in the left inguinal area.   Genitourinary: Rectal exam shows external hemorrhoid. Rectal exam shows no mass. Right testis shows no mass, no swelling and no tenderness. Left testis shows no mass and no tenderness. Circumcised. No penile tenderness.   Neurological: He is alert and oriented to person, place, and time.   Skin: Skin is warm and dry. He is not diaphoretic.   Psychiatric: He has a normal mood and affect. His behavior is normal.   Nursing note and vitals reviewed.      ED Course        Procedures               Critical Care time:  none               Results for orders placed or performed during the hospital encounter of 08/23/19 (from the past 24 hour(s))   CBC with platelets differential   Result Value Ref Range    WBC 9.2 4.0 - 11.0 10e9/L    RBC Count 6.00 (H) 4.4 - 5.9 10e12/L    Hemoglobin 17.0 13.3 - 17.7 g/dL    Hematocrit 51.4 40.0 - 53.0 %    MCV 86 78 - 100 fl    MCH 28.3 26.5 - 33.0 pg     MCHC 33.1 31.5 - 36.5 g/dL    RDW 12.3 10.0 - 15.0 %    Platelet Count 295 150 - 450 10e9/L    Diff Method Automated Method     % Neutrophils 69.6 %    % Lymphocytes 20.2 %    % Monocytes 6.6 %    % Eosinophils 2.5 %    % Basophils 0.8 %    % Immature Granulocytes 0.3 %    Nucleated RBCs 0 0 /100    Absolute Neutrophil 6.4 1.6 - 8.3 10e9/L    Absolute Lymphocytes 1.9 0.8 - 5.3 10e9/L    Absolute Monocytes 0.6 0.0 - 1.3 10e9/L    Absolute Eosinophils 0.2 0.0 - 0.7 10e9/L    Absolute Basophils 0.1 0.0 - 0.2 10e9/L    Abs Immature Granulocytes 0.0 0 - 0.4 10e9/L    Absolute Nucleated RBC 0.0    Comprehensive metabolic panel   Result Value Ref Range    Sodium 139 133 - 144 mmol/L    Potassium 3.8 3.4 - 5.3 mmol/L    Chloride 105 94 - 109 mmol/L    Carbon Dioxide 27 20 - 32 mmol/L    Anion Gap 7 3 - 14 mmol/L    Glucose 103 (H) 70 - 99 mg/dL    Urea Nitrogen 11 7 - 30 mg/dL    Creatinine 0.89 0.66 - 1.25 mg/dL    GFR Estimate >90 >60 mL/min/[1.73_m2]    GFR Estimate If Black >90 >60 mL/min/[1.73_m2]    Calcium 9.3 8.5 - 10.1 mg/dL    Bilirubin Total 0.7 0.2 - 1.3 mg/dL    Albumin 4.6 3.4 - 5.0 g/dL    Protein Total 8.3 6.8 - 8.8 g/dL    Alkaline Phosphatase 82 40 - 150 U/L    ALT 26 0 - 70 U/L    AST 20 0 - 45 U/L   Lipase   Result Value Ref Range    Lipase 341 73 - 393 U/L   UA reflex to Microscopic   Result Value Ref Range    Color Urine Yellow     Appearance Urine Clear     Glucose Urine Negative NEG^Negative mg/dL    Bilirubin Urine Negative NEG^Negative    Ketones Urine Negative NEG^Negative mg/dL    Specific Gravity Urine 1.010 1.003 - 1.035    Blood Urine Negative NEG^Negative    pH Urine 6.0 5.0 - 7.0 pH    Protein Albumin Urine Negative NEG^Negative mg/dL    Urobilinogen mg/dL 0.0 0.0 - 2.0 mg/dL    Nitrite Urine Negative NEG^Negative    Leukocyte Esterase Urine Negative NEG^Negative    Source Midstream Urine        Medications - No data to display    Assessments & Plan (with Medical Decision Making)    58-year-old male who presents for 5 years of abdominal pain that is periumbilical.  Heart 67, temperature is 99.6  F, SPO2 is 99% on room air, blood pressure 141/81.  Urinalysis normal without signs of bladder infection, unlikely nephrolithiasis or pyelonephritis.  Lipase normal, unlikely pancreatitis.  LFTs normal, unlikely hepatitis.  Electro lites are within normal limits.  White blood cell count 9.2 and hemoglobin is 17.  We discussed CT of the abdomen/pelvis but the patient declined stating he had an appointment that he needed to get to.  He says he feels comfortable going home and is therefore discharged with instructions to return if he has worsening symptoms or other concerns, otherwise try taking half on the call and ranitidine for his abdomen, use hydroxyzine for anxiety, return if worse, otherwise follow-up in clinic.  The patient is in agreement with this plan.    I have reviewed the nursing notes.    I have reviewed the findings, diagnosis, plan and need for follow up with the patient.       Discharge Medication List as of 8/23/2019  2:31 PM      START taking these medications    Details   hydrOXYzine (VISTARIL) 50 MG capsule Take 1 capsule (50 mg) by mouth 3 times daily as needed for anxiety, Disp-30 capsule, R-0, E-Prescribe      polyethylene glycol (MIRALAX) powder Take 17 g (1 capful) by mouth daily, Disp-527 g, R-0, E-Prescribe      ranitidine (ZANTAC) 150 MG tablet Take 2 tablets (300 mg) by mouth At Bedtime for 15 days, Disp-30 tablet, R-0, E-Prescribe             Final diagnoses:   Weight loss   Abdominal discomfort   Left inguinal hernia       8/23/2019   [facility] EMERGENCY DEPARTMENT     Narciso Valdez MD  08/23/19 [id number]

## 2019-08-23 NOTE — ED NOTES
"Pt here with hx that he quit drinking 6 months ago, quit smoking 4 days ago, \"Hasn't been to the doctor for a while\" and now is concerned aboutt his a abd/ingunal buldge/hernia that is worsening and concerns about weight loss of 40 lbs over the last few months. He also has a personal concern to discuss that he did not wish to discuss with the writer. Poor appetite and poor bowel movement  "

## 2021-07-19 ENCOUNTER — HOSPITAL ENCOUNTER (EMERGENCY)
Facility: CLINIC | Age: 61
End: 2021-07-19
Payer: MEDICARE

## 2021-07-26 ENCOUNTER — HOSPITAL ENCOUNTER (OUTPATIENT)
Dept: CT IMAGING | Facility: CLINIC | Age: 61
Discharge: HOME | End: 2021-07-26
Attending: FAMILY MEDICINE | Admitting: FAMILY MEDICINE
Payer: MEDICARE

## 2021-07-26 DIAGNOSIS — R10.84 ABDOMINAL PAIN, GENERALIZED: ICD-10-CM

## 2021-07-26 PROCEDURE — 74176 CT ABD & PELVIS W/O CONTRAST: CPT

## 2021-07-26 RX ORDER — IOPAMIDOL 755 MG/ML
79 INJECTION, SOLUTION INTRAVASCULAR ONCE
Status: DISCONTINUED | OUTPATIENT
Start: 2021-07-26 | End: 2021-07-26 | Stop reason: CLARIF

## 2021-09-09 ENCOUNTER — MEDICAL CORRESPONDENCE (OUTPATIENT)
Dept: HEALTH INFORMATION MANAGEMENT | Facility: CLINIC | Age: 61
End: 2021-09-09

## 2022-05-11 NOTE — TELEPHONE ENCOUNTER
LVM with Dr. Blanco's recommendations. Call if he had further questions or wanted to schedule an injection.    India Kirkland ATC     Left eye

## 2023-10-24 ENCOUNTER — TRANSCRIBE ORDERS (OUTPATIENT)
Dept: OTHER | Age: 63
End: 2023-10-24

## 2023-10-24 DIAGNOSIS — Z12.11 SCREENING FOR COLON CANCER: Primary | ICD-10-CM

## 2024-10-04 ENCOUNTER — TRANSCRIBE ORDERS (OUTPATIENT)
Dept: OTHER | Age: 64
End: 2024-10-04

## 2024-10-04 DIAGNOSIS — Z12.11 COLON CANCER SCREENING: Primary | ICD-10-CM
